# Patient Record
Sex: MALE | Race: WHITE | Employment: OTHER | URBAN - METROPOLITAN AREA
[De-identification: names, ages, dates, MRNs, and addresses within clinical notes are randomized per-mention and may not be internally consistent; named-entity substitution may affect disease eponyms.]

---

## 2017-12-04 ENCOUNTER — HOSPITAL ENCOUNTER (EMERGENCY)
Age: 37
Discharge: HOME | End: 2017-12-04
Attending: EMERGENCY MEDICINE
Payer: OTHER GOVERNMENT

## 2017-12-04 VITALS
SYSTOLIC BLOOD PRESSURE: 132 MMHG | WEIGHT: 228 LBS | DIASTOLIC BLOOD PRESSURE: 68 MMHG | RESPIRATION RATE: 18 BRPM | OXYGEN SATURATION: 97 % | HEIGHT: 71 IN | BODY MASS INDEX: 31.92 KG/M2 | TEMPERATURE: 98.9 F | HEART RATE: 97 BPM

## 2017-12-04 DIAGNOSIS — M54.50 ACUTE LEFT-SIDED LOW BACK PAIN WITHOUT SCIATICA: Primary | ICD-10-CM

## 2017-12-04 PROCEDURE — 74011250637 HC RX REV CODE- 250/637: Performed by: EMERGENCY MEDICINE

## 2017-12-04 PROCEDURE — 99284 EMERGENCY DEPT VISIT MOD MDM: CPT

## 2017-12-04 RX ORDER — OXYCODONE AND ACETAMINOPHEN 5; 325 MG/1; MG/1
1 TABLET ORAL
Qty: 12 TAB | Refills: 0 | Status: SHIPPED | OUTPATIENT
Start: 2017-12-04

## 2017-12-04 RX ORDER — IBUPROFEN 800 MG/1
800 TABLET ORAL
Qty: 15 TAB | Refills: 0 | Status: SHIPPED | OUTPATIENT
Start: 2017-12-04

## 2017-12-04 RX ORDER — LIDOCAINE 50 MG/G
1 PATCH TOPICAL EVERY 24 HOURS
Status: DISCONTINUED | OUTPATIENT
Start: 2017-12-04 | End: 2017-12-04 | Stop reason: HOSPADM

## 2017-12-04 RX ORDER — OXYCODONE HYDROCHLORIDE 5 MG/1
10 TABLET ORAL
Status: COMPLETED | OUTPATIENT
Start: 2017-12-04 | End: 2017-12-04

## 2017-12-04 RX ORDER — LIDOCAINE 50 MG/G
PATCH TOPICAL
Qty: 15 EACH | Refills: 0 | Status: SHIPPED | OUTPATIENT
Start: 2017-12-04

## 2017-12-04 RX ORDER — CYCLOBENZAPRINE HCL 10 MG
10 TABLET ORAL
Qty: 15 TAB | Refills: 0 | Status: SHIPPED | OUTPATIENT
Start: 2017-12-04

## 2017-12-04 RX ORDER — CYCLOBENZAPRINE HCL 10 MG
10 TABLET ORAL
Status: COMPLETED | OUTPATIENT
Start: 2017-12-04 | End: 2017-12-04

## 2017-12-04 RX ORDER — IBUPROFEN 400 MG/1
800 TABLET ORAL
Status: COMPLETED | OUTPATIENT
Start: 2017-12-04 | End: 2017-12-04

## 2017-12-04 RX ADMIN — IBUPROFEN 800 MG: 400 TABLET, FILM COATED ORAL at 09:04

## 2017-12-04 RX ADMIN — CYCLOBENZAPRINE HYDROCHLORIDE 10 MG: 10 TABLET, FILM COATED ORAL at 09:04

## 2017-12-04 RX ADMIN — OXYCODONE HYDROCHLORIDE 10 MG: 5 TABLET ORAL at 09:04

## 2017-12-04 NOTE — ED PROVIDER NOTES
EMERGENCY DEPARTMENT HISTORY AND PHYSICAL EXAM    Date: 12/4/2017  Patient Name: Thais Chapman    History of Presenting Illness     Chief Complaint   Patient presents with    Back Pain    Shoulder Pain    Rectal Bleeding    Hemorrhoids         History Provided By: Patient    Chief Complaint: Left lower back pain  Duration: 2-3 Hours  Timing:  Chronic  Location: Left lower back  Severity: 7 out of 10  Modifying Factors: No alleviation with 1000 mg acetaminophen. Associated Symptoms: left shoulder pain, anal bleeding (bright red), diaphoresis, palpitations, right leg numbness, and suprapubic abdominal pain    Additional History (Context):   8:47 AM  Thais Chapman is a 31 y.o. male with PMHx herniated discs and hemorrhoids who presents to the emergency department C/O left lower back pain (7/10) onset 2-3 hours ago s/p exercising (sit ups and bicycle work). Associated sxs include chronic left shoulder pain, anal bleeding (bright red), diaphoresis, palpitations, right leg numbness, and suprapubic abdominal pain described as discomfort. Pt was participating in a PT test when he started to experience left shoulder pain, which he recently had surgery on. Pt reports similar bleeding sxs when he previously had hemorrhoids. Pt reports use of 1000 mg acetaminophen without relief. Pt denies nausea, vomiting, fever, chills,, and any other sxs or complaints. PCP: Divine Stoll MD        Past History     Past Medical History:  Past Medical History:   Diagnosis Date    Hemorrhoids     Herniated lumbar intervertebral disc     Ill-defined condition        Past Surgical History:  Past Surgical History:   Procedure Laterality Date    HX ORTHOPAEDIC         Family History:  History reviewed. No pertinent family history. Social History:  Social History   Substance Use Topics    Smoking status: Never Smoker    Smokeless tobacco: Never Used    Alcohol use No       Allergies:   Allergies   Allergen Reactions    Codeine Other (comments)         Review of Systems   Review of Systems   Constitutional: Positive for diaphoresis. Negative for chills and fever. Cardiovascular: Positive for palpitations. Gastrointestinal: Positive for abdominal pain (suprapubic) and anal bleeding. Negative for nausea and vomiting. Musculoskeletal: Positive for back pain (left lower). Arthralgias: left shoulder. Neurological: Positive for numbness (right leg). All other systems reviewed and are negative. Physical Exam     Vitals:    12/04/17 0850 12/04/17 0900 12/04/17 0915 12/04/17 0930   BP: (!) 154/93 133/81 130/72 132/68   Pulse: 97      Resp: 18      Temp: 98.9 °F (37.2 °C)      SpO2: 100% 99% 99% 97%   Weight: 103.4 kg (228 lb)      Height: 5' 11\" (1.803 m)        Physical Exam   Nursing note and vitals reviewed. Constitutional: Well appearing, mild distress  Head: Normocephalic, Atraumatic  Eyes: EOMI  Neck: Supple, non-tender  Cardiovascular: Regular rate and rhythm, no murmurs, rubs, or gallops  Chest: Normal work of breathing and chest excursion bilaterally  Lungs: Clear to ausculation bilaterally  Abdomen: Soft, non tender, non distended, normoactive bowel sounds  Back: No evidence of trauma or deformity. Tender over the midline and left lower lumbar and thoracis region. Extremities: No evidence of trauma or deformity, no LE edema  Skin: Warm and dry, normal cap refill  Neuro: Alert and appropriate, CN intact, normal speech, strength and sensation full and symmetric bilaterally, normal coordination  Psychiatric: Normal mood and affect      Diagnostic Study Results     Labs -   No results found for this or any previous visit (from the past 12 hour(s)).     Radiologic Studies -   No orders to display     CT Results  (Last 48 hours)    None        CXR Results  (Last 48 hours)    None          Medications given in the ED-  Medications   lidocaine (LIDODERM) 5 % patch 1 Patch (not administered)   ibuprofen (MOTRIN) tablet 800 mg (800 mg Oral Given 12/4/17 0904)   cyclobenzaprine (FLEXERIL) tablet 10 mg (10 mg Oral Given 12/4/17 0904)   oxyCODONE IR (ROXICODONE) tablet 10 mg (10 mg Oral Given 12/4/17 9783)         Medical Decision Making   I am the first provider for this patient. I reviewed the vital signs, available nursing notes, past medical history, past surgical history, family history and social history. Vital Signs-Reviewed the patient's vital signs. Pulse Oximetry Analysis - 100% on RA     Records Reviewed: Nursing Notes    Procedures:  Procedures    ED Course:   8:47 AM Initial assessment performed. The patients presenting problems have been discussed, and they are in agreement with the care plan formulated and outlined with them. I have encouraged them to ask questions as they arise throughout their visit. 9:42 AM Pt states he is feeling much better. Diagnosis and Disposition     Discussion:  31 y.o male with left sided low back pain after PT today. No alarm signs or symptoms. Normal neuro exam. Plan for symptom management and discharge with ortho referral and return precautions. Pt understands and agrees with this plan. DISCHARGE NOTE:  9:45 AM  Hung Toi Gonzales's  results have been reviewed with him. He has been counseled regarding his diagnosis, treatment, and plan. He verbally conveys understanding and agreement of the signs, symptoms, diagnosis, treatment and prognosis and additionally agrees to follow up as discussed. He also agrees with the care-plan and conveys that all of his questions have been answered. I have also provided discharge instructions for him that include: educational information regarding their diagnosis and treatment, and list of reasons why they would want to return to the ED prior to their follow-up appointment, should his condition change. He has been provided with education for proper emergency department utilization. CLINICAL IMPRESSION:    1.  Acute left-sided low back pain without sciatica        PLAN:  1. D/C Home  2. Current Discharge Medication List      START taking these medications    Details   ibuprofen (MOTRIN) 800 mg tablet Take 1 Tab by mouth every six (6) hours as needed for Pain. Qty: 15 Tab, Refills: 0      cyclobenzaprine (FLEXERIL) 10 mg tablet Take 1 Tab by mouth three (3) times daily as needed for Muscle Spasm(s). Qty: 15 Tab, Refills: 0      lidocaine (LIDODERM) 5 % Apply patch to the affected area for 12 hours a day and remove for 12 hours a day. Qty: 15 Each, Refills: 0      oxyCODONE-acetaminophen (PERCOCET) 5-325 mg per tablet Take 1 Tab by mouth every six (6) hours as needed for Pain for up to 12 doses. Max Daily Amount: 4 Tabs. Qty: 12 Tab, Refills: 0           3. Follow-up Information     Follow up With Details Comments [address], [place] Schedule an appointment as soon as possible for a visit in 3 days For orthopedic follow up. [facility]   [phone]      BRITANY Schedule an appointment as soon as possible for a visit in 3 days For orthopedic and primary care follow up. [phone]    [facility] EMERGENCY DEPT Go to As needed, If symptoms worsen [address] [place] [id number]  [phone]        _______________________________    Attestations: This note is prepared by Jeffry Regalado, acting as Scribe for Ashlee Shannon MD.    Ashlee Shannon MD:  The scribe's documentation has been prepared under my direction and personally reviewed by me in its entirety.   I confirm that the note above accurately reflects all work, treatment, procedures, and medical decision making performed by me.  _______________________________

## 2017-12-04 NOTE — DISCHARGE INSTRUCTIONS

## 2017-12-04 NOTE — LETTER
[facility] [place] 
THE [facility] EMERGENCY DEPT 
[address] [address] 
[phone] Work/School Note Date: 12/4/2017 To Whom It May concern: 
 
Martha Stein was seen and treated today in the emergency room by the following provider(s): 
Attending Provider: Abram Batista MD.   
 
Martha Sarita is confined to quarters until 12/6/17 Sincerely, 
 
 
 
 
Oral Pringle MD

## 2017-12-04 NOTE — ED TRIAGE NOTES
Pt arrives from [facility] with initial c/o low back pain with hx of herniated disks, Pt was participating in PT test and c/o shoulder pain, that he has had surgery on and lower lt sided back pain with numbness to lt thigh and upon further questioning hemorrhoids and rectal bleeding this am from doing sit-ups and riding stationary bike.

## 2017-12-04 NOTE — ED NOTES
Pt discharged home stable and ambulatory. Pain level at discharge 4 . Pt discharged with friend/family. Reviewed discharged instructions with  rx and verbalized understanding.    Patient armband removed and shredded